# Patient Record
Sex: FEMALE | Race: WHITE
[De-identification: names, ages, dates, MRNs, and addresses within clinical notes are randomized per-mention and may not be internally consistent; named-entity substitution may affect disease eponyms.]

---

## 2018-05-04 ENCOUNTER — HOSPITAL ENCOUNTER (INPATIENT)
Dept: HOSPITAL 17 - PHED | Age: 83
LOS: 3 days | Discharge: HOME | DRG: 378 | End: 2018-05-07
Attending: HOSPITALIST | Admitting: HOSPITALIST
Payer: COMMERCIAL

## 2018-05-04 VITALS
RESPIRATION RATE: 18 BRPM | OXYGEN SATURATION: 97 % | SYSTOLIC BLOOD PRESSURE: 150 MMHG | DIASTOLIC BLOOD PRESSURE: 62 MMHG | HEART RATE: 77 BPM

## 2018-05-04 VITALS
OXYGEN SATURATION: 98 % | TEMPERATURE: 98.5 F | RESPIRATION RATE: 16 BRPM | DIASTOLIC BLOOD PRESSURE: 65 MMHG | HEART RATE: 80 BPM | SYSTOLIC BLOOD PRESSURE: 152 MMHG

## 2018-05-04 VITALS
OXYGEN SATURATION: 99 % | HEART RATE: 86 BPM | DIASTOLIC BLOOD PRESSURE: 79 MMHG | SYSTOLIC BLOOD PRESSURE: 188 MMHG | RESPIRATION RATE: 20 BRPM | TEMPERATURE: 97.2 F

## 2018-05-04 VITALS
RESPIRATION RATE: 20 BRPM | TEMPERATURE: 97 F | SYSTOLIC BLOOD PRESSURE: 109 MMHG | OXYGEN SATURATION: 99 % | HEART RATE: 60 BPM | DIASTOLIC BLOOD PRESSURE: 54 MMHG

## 2018-05-04 VITALS — OXYGEN SATURATION: 97 %

## 2018-05-04 VITALS — HEIGHT: 63 IN | BODY MASS INDEX: 16.95 KG/M2 | WEIGHT: 95.68 LBS

## 2018-05-04 VITALS
RESPIRATION RATE: 18 BRPM | HEART RATE: 75 BPM | OXYGEN SATURATION: 99 % | DIASTOLIC BLOOD PRESSURE: 75 MMHG | SYSTOLIC BLOOD PRESSURE: 172 MMHG

## 2018-05-04 VITALS — DIASTOLIC BLOOD PRESSURE: 83 MMHG | SYSTOLIC BLOOD PRESSURE: 129 MMHG

## 2018-05-04 VITALS — DIASTOLIC BLOOD PRESSURE: 88 MMHG | SYSTOLIC BLOOD PRESSURE: 168 MMHG

## 2018-05-04 VITALS — OXYGEN SATURATION: 99 %

## 2018-05-04 DIAGNOSIS — I10: ICD-10-CM

## 2018-05-04 DIAGNOSIS — K62.1: ICD-10-CM

## 2018-05-04 DIAGNOSIS — K55.21: ICD-10-CM

## 2018-05-04 DIAGNOSIS — K64.8: ICD-10-CM

## 2018-05-04 DIAGNOSIS — B96.20: ICD-10-CM

## 2018-05-04 DIAGNOSIS — I48.2: ICD-10-CM

## 2018-05-04 DIAGNOSIS — K29.71: Primary | ICD-10-CM

## 2018-05-04 DIAGNOSIS — F17.210: ICD-10-CM

## 2018-05-04 DIAGNOSIS — Z79.01: ICD-10-CM

## 2018-05-04 DIAGNOSIS — E78.5: ICD-10-CM

## 2018-05-04 DIAGNOSIS — D62: ICD-10-CM

## 2018-05-04 DIAGNOSIS — R60.0: ICD-10-CM

## 2018-05-04 DIAGNOSIS — N39.0: ICD-10-CM

## 2018-05-04 DIAGNOSIS — Z95.0: ICD-10-CM

## 2018-05-04 DIAGNOSIS — E78.00: ICD-10-CM

## 2018-05-04 LAB
BACTERIA #/AREA URNS HPF: (no result) /HPF
BASOPHILS # BLD AUTO: 0 TH/MM3 (ref 0–0.2)
BASOPHILS NFR BLD: 0.8 % (ref 0–2)
BUN SERPL-MCNC: 18 MG/DL (ref 7–18)
CALCIUM SERPL-MCNC: 8.3 MG/DL (ref 8.5–10.1)
CHLORIDE SERPL-SCNC: 103 MEQ/L (ref 98–107)
COLOR UR: YELLOW
CREAT SERPL-MCNC: 0.69 MG/DL (ref 0.5–1)
EOSINOPHIL # BLD: 0.1 TH/MM3 (ref 0–0.4)
EOSINOPHIL NFR BLD: 2.7 % (ref 0–4)
ERYTHROCYTE [DISTWIDTH] IN BLOOD BY AUTOMATED COUNT: 17 % (ref 11.6–17.2)
GFR SERPLBLD BASED ON 1.73 SQ M-ARVRAT: 80 ML/MIN (ref 89–?)
GLUCOSE SERPL-MCNC: 109 MG/DL (ref 74–106)
GLUCOSE UR STRIP-MCNC: (no result) MG/DL
HCO3 BLD-SCNC: 26.8 MEQ/L (ref 21–32)
HCT VFR BLD CALC: 21.7 % (ref 35–46)
HGB BLD-MCNC: 6.4 GM/DL (ref 11.6–15.3)
HGB UR QL STRIP: (no result)
INR PPP: 1.1 RATIO
INR PPP: 1.1 RATIO
KETONES UR STRIP-MCNC: (no result) MG/DL
LYMPHOCYTES # BLD AUTO: 0.9 TH/MM3 (ref 1–4.8)
LYMPHOCYTES NFR BLD AUTO: 21.1 % (ref 9–44)
MCH RBC QN AUTO: 19.9 PG (ref 27–34)
MCHC RBC AUTO-ENTMCNC: 29.3 % (ref 32–36)
MCV RBC AUTO: 67.9 FL (ref 80–100)
MONOCYTE #: 0.4 TH/MM3 (ref 0–0.9)
MONOCYTES NFR BLD: 10 % (ref 0–8)
NEUTROPHILS # BLD AUTO: 2.8 TH/MM3 (ref 1.8–7.7)
NEUTROPHILS NFR BLD AUTO: 65.4 % (ref 16–70)
NITRITE UR QL STRIP: (no result)
PLATELET # BLD: 407 TH/MM3 (ref 150–450)
PMV BLD AUTO: 7.2 FL (ref 7–11)
PROTHROMBIN TIME: 10.7 SEC (ref 9.8–11.6)
PROTHROMBIN TIME: 10.7 SEC (ref 9.8–11.6)
RBC # BLD AUTO: 3.19 MIL/MM3 (ref 4–5.3)
SCHISTOCYTES BLD QL SMEAR: (no result)
SODIUM SERPL-SCNC: 135 MEQ/L (ref 136–145)
SP GR UR STRIP: (no result) (ref 1–1.03)
TARGETS BLD QL SMEAR: (no result)
URINE LEUKOCYTE ESTERASE: (no result)
WBC # BLD AUTO: 4.2 TH/MM3 (ref 4–11)
WHITE BLOOD CELL CLUMPS: (no result)

## 2018-05-04 PROCEDURE — 80048 BASIC METABOLIC PNL TOTAL CA: CPT

## 2018-05-04 PROCEDURE — 85730 THROMBOPLASTIN TIME PARTIAL: CPT

## 2018-05-04 PROCEDURE — C9132 KCENTRA, PER I.U.: HCPCS

## 2018-05-04 PROCEDURE — 86850 RBC ANTIBODY SCREEN: CPT

## 2018-05-04 PROCEDURE — 87077 CULTURE AEROBIC IDENTIFY: CPT

## 2018-05-04 PROCEDURE — 93970 EXTREMITY STUDY: CPT

## 2018-05-04 PROCEDURE — 81001 URINALYSIS AUTO W/SCOPE: CPT

## 2018-05-04 PROCEDURE — C9113 INJ PANTOPRAZOLE SODIUM, VIA: HCPCS

## 2018-05-04 PROCEDURE — 87086 URINE CULTURE/COLONY COUNT: CPT

## 2018-05-04 PROCEDURE — 86920 COMPATIBILITY TEST SPIN: CPT

## 2018-05-04 PROCEDURE — 85025 COMPLETE CBC W/AUTO DIFF WBC: CPT

## 2018-05-04 PROCEDURE — 96361 HYDRATE IV INFUSION ADD-ON: CPT

## 2018-05-04 PROCEDURE — 36430 TRANSFUSION BLD/BLD COMPNT: CPT

## 2018-05-04 PROCEDURE — 74177 CT ABD & PELVIS W/CONTRAST: CPT

## 2018-05-04 PROCEDURE — 96375 TX/PRO/DX INJ NEW DRUG ADDON: CPT

## 2018-05-04 PROCEDURE — 86901 BLOOD TYPING SEROLOGIC RH(D): CPT

## 2018-05-04 PROCEDURE — 86900 BLOOD TYPING SEROLOGIC ABO: CPT

## 2018-05-04 PROCEDURE — 93005 ELECTROCARDIOGRAM TRACING: CPT

## 2018-05-04 PROCEDURE — 87186 SC STD MICRODIL/AGAR DIL: CPT

## 2018-05-04 PROCEDURE — P9016 RBC LEUKOCYTES REDUCED: HCPCS

## 2018-05-04 PROCEDURE — 88305 TISSUE EXAM BY PATHOLOGIST: CPT

## 2018-05-04 PROCEDURE — 96374 THER/PROPH/DIAG INJ IV PUSH: CPT

## 2018-05-04 PROCEDURE — 85610 PROTHROMBIN TIME: CPT

## 2018-05-04 RX ADMIN — Medication SCH ML: at 19:44

## 2018-05-04 RX ADMIN — PANTOPRAZOLE SODIUM SCH MG: 40 INJECTION, POWDER, FOR SOLUTION INTRAVENOUS at 16:14

## 2018-05-04 RX ADMIN — CIPROFLOXACIN SCH MLS/HR: 2 INJECTION, SOLUTION INTRAVENOUS at 17:33

## 2018-05-04 RX ADMIN — STANDARDIZED SENNA CONCENTRATE AND DOCUSATE SODIUM SCH TAB: 8.6; 5 TABLET, FILM COATED ORAL at 21:11

## 2018-05-04 RX ADMIN — LOSARTAN POTASSIUM SCH MG: 50 TABLET, FILM COATED ORAL at 21:11

## 2018-05-04 NOTE — HHI.HP
__________________________________________________





HPI


Service


[facility]


Primary Care Physician


Yumiko Kahn MD


Admission Diagnosis


GI bleed on xarelto


Diagnoses:  


(1) GI bleed


Chief Complaint:  


Anemia


Dark stools


Travel History


International Travel<30 Days:  No


Contact w/Intl Traveler <30 Da:  No


Traveled to Known Affected Are:  No


History of Present Illness


This is a pleasant 88-year-old female patient with a known medical history of 

A. fib on anticoagulation, hypertension hyperlipidemia who presented to the ED 

with a hemoglobin of 6.4.  Patient visited her PCP office this week and was 

called with results of anemia and sent here to the ED.  Patient does state that 

over the past couple months she has noticed black stools with occasional bright 

red blood when she wipes.  Patient denies any other associated symptoms such as 

lightheadedness, dizziness.  She does admit to increasing fatigue and sleeping 

more often.  Patient has been on Xarelto for chronic atrial fibrillation, 

patient is followed with Dr. Parish cardiology. Denies any recent fever, 

chills, cough, chest pain, shortness of breath, nausea, vomiting, diarrhea, 

abdominal pain or dysuria.  Does admit to bilateral lower extremity swelling.  

Patient lives at home with her daughter.  PCP Dr. Kahn.





Review of Systems


Constitutional:  COMPLAINS OF: Fatigue, DENIES: Fever, Chills


Eyes:  DENIES: Diplopia


Respiratory:  DENIES: Cough, Sputum production, Shortness of breath


Cardiovascular:  DENIES: Chest pain, Palpitations


Gastrointestinal:  COMPLAINS OF: Black stools, DENIES: Abdominal pain, Bloody 

stools, Constipation, Diarrhea, Nausea, Vomiting


Musculoskeletal:  DENIES: Joint pain


Hematologic/lymphatic:  DENIES: Bruising


Neurologic:  DENIES: Abnormal gait


Psychiatric:  DENIES: Anxiety


Except as stated in HPI:  all other systems reviewed are Neg





Past Family Social History


Past Medical History


Atrial fibrillation on Coumadin


Anxiety depression


Hyperlipidemia


Hypertension


Past Surgical History


Pacemaker placement


Left side cataracts


Tonsillectomy


Appendectomy


Reported Medications


Reported


[glaucoma drops]   1 Drop OP BID


Losartan (Losartan Potassium) 100 Mg Tab 100 Mg PO HS


Xarelto (Rivaroxaban) 20 Mg Tab 20 Mg PO DAILY


Allergies:  


Coded Allergies:  


     penicillin G (Unverified  Allergy, Severe, diarrhea, 18)


Active Ordered Medications





Current Medications








 Medications


  (Trade)  Dose


 Ordered  Sig/Rupali


 Route  Start Time


 Stop Time Status Last Admin


 


 Sodium Chloride  250 ml @ 


 15 mls/hr  ONCE  ONCE


 IV  18 12:30


 18 05:09   


 


 


  (NS Flush)  2 ml  UNSCH  PRN


 IV FLUSH  18 13:45


     


 


 


  (NS Flush)  2 ml  BID


 IV FLUSH  18 21:00


     


 


 


  (Tylenol)  650 mg  Q4H  PRN


 PO  18 13:45


     


 


 


  (Zofran Inj)  4 mg  Q6H  PRN


 IVP  18 13:45


     


 


 


  (Narcan Inj)  0.4 mg  UNSCH  PRN


 IV PUSH  18 13:45


     


 


 


  (Sepideh-Colace)  1 tab  BID


 PO  18 21:00


     


 


 


  (Milk Of


 Magnesia Liq)  30 ml  Q12H  PRN


 PO  18 13:45


     


 


 


  (Senokot)  17.2 mg  Q12H  PRN


 PO  18 13:45


     


 


 


  (Dulcolax Supp)  10 mg  DAILY  PRN


 RECTAL  18 13:45


     


 


 


  (Protonix Inj)  40 mg  Q12H


 IV PUSH  18 16:00


     


 


 


 Sodium Chloride  250 ml @ 


 15 mls/hr  ONCE  ONCE


 IV  18 15:30


 18 08:09   


 


 


  (Cozaar)  100 mg  HS


 PO  18 21:00


     


 


 


  (Catapres)  0.1 mg  Q6H  PRN


 PO  18 15:30


     


 








Family History


Denies any significant family medical history.


Social History


Does admit to smoking 8 cigarettes per day.  Denies any alcohol or illicit drug 

use.





Physical Exam


Vital Signs





Vital Signs








  Date Time  Temp Pulse Resp B/P (MAP) Pulse Ox O2 Delivery O2 Flow Rate FiO2


 


18 12:39  77 18 150/62 (91) 97 Room Air  


 


18 11:50     97 Room Air  


 


18 11:28 98.5 80 16 152/65 (94) 98   








Physical Exam


GENERAL: Well-developed, well-nourished elderly female patient in NAD.


SKIN: Warm and dry. No rash.


HEAD:  Normocephalic. Atraumatic.


EYES: Pupils equal and round. No scleral icterus. No injection or drainage. 


ENT: No nasal bleeding or discharge.  Mucous membranes pink and moist.


NECK: Supple. Trachea midline.  


CARDIOVASCULAR: Regular rate and rhythm.  S1, S2 noted. No murmur appreciated. 


RESPIRATORY: No accessory muscle use. Clear to auscultation. Breath sounds 

equal bilaterally.  


GASTROINTESTINAL: Abdomen soft, non-tender, nondistended. Normoactive bowel 

sounds x4.


MUSCULOSKELETAL: No obvious deformities. Extremities without clubbing, 

cyanosis.  Bilateral lower extremity edema, 2+.


NEUROLOGICAL: Awake and alert. No obvious cranial nerve deficits.  Motor 

grossly within normal limits. 5/5 muscle strength in bilateral upper and lower 

extremities.  Normal speech.


PSYCHIATRIC: Appropriate mood and affect; insight and judgment normal.


Laboratory





Laboratory Tests








Test


  18


12:08 18


13:30


 


White Blood Count 4.2  


 


Red Blood Count 3.19  


 


Hemoglobin 6.4  


 


Hematocrit 21.7  


 


Mean Corpuscular Volume 67.9  


 


Mean Corpuscular Hemoglobin 19.9  


 


Mean Corpuscular Hemoglobin


Concent 29.3 


  


 


 


Red Cell Distribution Width 17.0  


 


Platelet Count 407  


 


Mean Platelet Volume 7.2  


 


Neutrophils (%) (Auto) 65.4  


 


Lymphocytes (%) (Auto) 21.1  


 


Monocytes (%) (Auto) 10.0  


 


Eosinophils (%) (Auto) 2.7  


 


Basophils (%) (Auto) 0.8  


 


Neutrophils # (Auto) 2.8  


 


Lymphocytes # (Auto) 0.9  


 


Monocytes # (Auto) 0.4  


 


Eosinophils # (Auto) 0.1  


 


Basophils # (Auto) 0.0  


 


CBC Comment AUTO DIFF  


 


Differential Comment


  AUTO DIFF


CONFIRMED 


 


 


Target Cells 1+  


 


Keratocytes OCC  


 


Prothrombin Time 10.7  


 


Prothromb Time International


Ratio 1.1 


  


 


 


Activated Partial


Thromboplast Time 24.7 


  


 


 


Blood Urea Nitrogen 18  


 


Creatinine 0.69  


 


Random Glucose 109  


 


Calcium Level 8.3  


 


Sodium Level 135  


 


Potassium Level 4.0  


 


Chloride Level 103  


 


Carbon Dioxide Level 26.8  


 


Anion Gap 5  


 


Estimat Glomerular Filtration


Rate 80 


  


 








Result Diagram:  


18 1208                                                                    

            18 1208








Septic Shock Reassessment


Septic shock perfusion:  reassessment completed





Caprini VTE Risk Assessment


Caprini VTE Risk Assessment:  Mod/High Risk (score >= 2)


Caprini Risk Assessment Model











 Point Value = 1          Point Value = 2  Point Value = 3  Point Value = 5


 


Age 41-60


Minor surgery


BMI > 25 kg/m2


Swollen legs


Varicose veins


Pregnancy or postpartum


History of unexplained or recurrent


   spontaneous 


Oral contraceptives or hormone


   replacement


Sepsis (< 1 month)


Serious lung disease, including


   pneumonia (< 1 month)


Abnormal pulmonary function


Acute myocardial infarction


Congestive heart failure (< 1 month)


History of inflammatory bowel disease


Medical patient at bed rest Age 61-74


Arthroscopic surgery


Major open surgery (> 45 min)


Laparoscopic surgery (> 45 min)


Malignancy


Confined to bed (> 72 hours)


Immobilizing plaster cast


Central venous access Age >= 75


History of VTE


Family history of VTE


Factor V Leiden


Prothrombin [id number]


Lupus anticoagulant


Anticardiolipin antibodies


Elevated serum homocysteine


Heparin-induced thrombocytopenia


Other congenital or acquired


   thrombophilia Stroke (< 1 month)


Elective arthroplasty


Hip, pelvis, or leg fracture


Acute spinal cord injury (< 1 month)








Prophylaxis Regimen











   Total Risk


Factor Score Risk Level Prophylaxis Regimen


 


0-1      Low Early ambulation


 


2 Moderate Order ONE of the following:


*Sequential Compression Device (SCD)


*Heparin 5000 units SQ BID


 


3-4 Higher Order ONE of the following medications:


*Heparin 5000 units SQ TID


*Enoxaparin/Lovenox 40 mg SQ daily (WT < 150 kg, CrCl > 30 mL/min)


*Enoxaparin/Lovenox 30 mg SQ daily (WT < 150 kg, CrCl > 10-29 mL/min)


*Enoxaparin/Lovenox 30 mg SQ BID (WT < 150 kg, CrCl > 30 mL/min)


AND/OR


*Sequential Compression Device (SCD)


 


5 or more Highest Order ONE of the following medications:


*Heparin 5000 units SQ TID (Preferred with Epidurals)


*Enoxaparin/Lovenox 40 mg SQ daily (WT < 150 kg, CrCl > 30 mL/min)


*Enoxaparin/Lovenox 30 mg SQ daily (WT < 150 kg, CrCl > 10-29 mL/min)


*Enoxaparin/Lovenox 30 mg SQ BID (WT < 150 kg, CrCl > 30 mL/min)


AND


*Sequential Compression Device (SCD)











Assessment and Plan


Problem List:  


(1) GI bleed


ICD Code:  K92.2 - Gastrointestinal hemorrhage, unspecified


Status:  Acute


Assessment and Plan


This is a pleasant 88-year-old female patient with a known medical history of 

A. fib on anticoagulation, hypertension hyperlipidemia who presented to the ED 

with a hemoglobin of 6.4.  Patient visited her PCP office this week and was 

called with results of anemia and sent here to the ED. 





Acute blood loss secondary to GI bleed with normocytic normochromic anemia.


- Hemoglobin 6.4/hematocrit 21.7 on presentation.  With black stools. Was given 

K Centra in ED.


- Consult placed to GI, appreciate input recommendations. Patient is refusing 

colonoscopy and EGD if needed.


- Type and screen, transfuse 2 units PRBC.  Trend H&H.  Monitor for any active 

bleeding.  Monitor vital signs.  Blood pressure has been stable.


- Monitor for fluid overload. Lasix given in between PRBC transfusion.


- Protonix IV BID. 





Abnormal UA with presence of leukocyte esterase and white blood cells.  Culture 

pending. Follow. IV Cipro, patient allergic to penicillins. 





Atrial fibrillation, chronic: On Xarelto at home. On hold due to GI bleed and 

acute blood loss. Continue cardiac telemetry. Monitor.





Hypertension, chronic: Elevated on presentation. Continue home medications. 

Monitor BP trends. Clonidine PRN as needed.





DVT Prophylaxis: SCDs. Hold chemical prophylaxis due to GI bleed.





Physician Certification


2 Midnight Certification Type:  Admission for Inpatient Services


Order for Inpatient Services


The services are ordered in accordance with Medicare regulations or non-

Medicare payer requirements, as applicable.  In the case of services not 

specified as inpatient-only, they are appropriately provided as inpatient 

services in accordance with the 2-midnight benchmark.


Estimated LOS (days):  3


3 days is the estimated time the patient will need to remain in the hospital, 

assuming treatment plan goals are met and no additional complications.


Post-Hospital Plan:  Not yet determined





Problem Qualifiers





(1) GI bleed:  


Qualified Codes:  K92.2 - Gastrointestinal hemorrhage, unspecified








Britt Mejia May 4, 2018 13:45

## 2018-05-04 NOTE — PD
HPI


Chief Complaint:  Abnormal Results


Time Seen by Provider:  11:40


Travel History


International Travel<30 days:  No


Contact w/Intl Traveler<30days:  No


Traveled to known affect area:  No





History of Present Illness


HPI


89yo F with PMH of afib on xarelto, HTN was sent here by her PMD Dr. Kahn 

for low hemoglobin of 6.4 on routine blood work drawn 2 days ago.  Pt denies 

any black stool, fever, cough, chest pain, sob, n/v, abdominal pain, focal 

weakness or numbness.  Pt does have swelling in both her feet.  Said she has 

had GI bleeds before but she has never had a colonoscopy and does not ever want 

one.





PFSH


Past Medical History


Blood Disorders:  No


Anxiety:  Yes


Depression:  Yes


Heart Rhythm Problems:  Yes (a-fib )


Cancer:  No


Cardiovascular Problems:  Yes


High Cholesterol:  Yes


Chest Pain:  Yes


Congestive Heart Failure:  No


Diminished Hearing:  No


Endocrine:  No


Genitourinary:  No


Hypertension:  Yes


Immune Disorder:  No


Implanted Vascular Access Dvce:  Yes


Musculoskeletal:  No


Neurologic:  No


Psychiatric:  Yes


Reproductive:  No


Respiratory:  No


Pregnant?:  Not Pregnant


Dilation and Curettage (D&C):  Yes





Past Surgical History


AICD:  No


Arteriovenous Shunt:  No


Cardiac Surgery:  Yes (pace maker )


Ear Surgery:  No


Endocrine Surgery:  No


Eye Surgery:  Yes (cataracts  LEFT SIDE)


Genitourinary Surgery:  No


Gynecologic Surgery:  Yes (d/c)


Insulin Pump:  No


Joint Replacement:  No


Oral Surgery:  No


Pacemaker:  Yes


Thoracic Surgery:  No


Other Surgery:  Yes





Social History


Alcohol Use:  No


Tobacco Use:  No


Substance Use:  No





Allergies-Medications


(Allergen,Severity, Reaction):  


Coded Allergies:  


     penicillin G (Unverified  Allergy, Severe, diarrhea, 5/4/18)


Reported Meds & Prescriptions





Reported Meds & Active Scripts


Active


Reported


[glaucoma drops]   1 Drop OP BID


Losartan (Losartan Potassium) 100 Mg Tab 100 Mg PO HS


Xarelto (Rivaroxaban) 20 Mg Tab 20 Mg PO DAILY








Review of Systems


Except as stated in HPI:  all other systems reviewed are Neg





Physical Exam


Narrative


GENERAL: 89yo F not in distress.


SKIN: Focused skin assessment warm/dry.


HEAD: Atraumatic. Normocephalic. 


EYES: Pupils equal and round. No scleral icterus. No injection or drainage. 


CARDIOVASCULAR: Regular rate and rhythm.  No murmur appreciated.


RESPIRATORY: No accessory muscle use. Clear to auscultation. Breath sounds 

equal bilaterally. 


GASTROINTESTINAL: Abdomen soft, non-tender, nondistended. 


RECTAL: Brown stool, positive hemaprompt.


MUSCULOSKELETAL: No obvious deformities. No clubbing.  No cyanosis.  No edema. 


NEUROLOGICAL: Awake and alert. No obvious cranial nerve deficits.  Motor 

grossly within normal limits. Normal speech.


PSYCHIATRIC: Appropriate mood and affect; insight and judgment normal.





Data


Data


Last Documented VS





Vital Signs








  Date Time  Temp Pulse Resp B/P (MAP) Pulse Ox O2 Delivery O2 Flow Rate FiO2


 


5/4/18 12:39  77 18 150/62 (91) 97 Room Air  


 


5/4/18 11:28 98.5       








Orders





 Orders


Basic Metabolic Panel (Bmp) (5/4/18 11:54)


Complete Blood Count With Diff (5/4/18 11:54)


Prothrombin Time / Inr (Pt) (5/4/18 11:54)


Act Partial Throm Time (Ptt) (5/4/18 11:54)


Type And Screen (5/4/18 11:54)


Pantoprazole Inj (Protonix Inj) (5/4/18 12:00)


Electrocardiogram (5/4/18 )


^ Lab Follow Up (5/4/18 12:20)


Prothrombin Complex Conc Inj (Kcentra In (5/4/18 14:00)


Blood Product Administration (5/4/18 12:20)


Sodium Chlor 0.9% 250 Ml Inj (Ns 250 Ml (5/4/18 12:30)


Consult Cardiology (5/4/18 )


Consult Gastroenterology (5/4/18 )


Admit Order (Ed Use Only) (5/4/18 13:13)





Labs





Laboratory Tests








Test


  5/4/18


12:08


 


White Blood Count 4.2 TH/MM3 


 


Red Blood Count 3.19 MIL/MM3 


 


Hemoglobin 6.4 GM/DL 


 


Hematocrit 21.7 % 


 


Mean Corpuscular Volume 67.9 FL 


 


Mean Corpuscular Hemoglobin 19.9 PG 


 


Mean Corpuscular Hemoglobin


Concent 29.3 % 


 


 


Red Cell Distribution Width 17.0 % 


 


Platelet Count 407 TH/MM3 


 


Mean Platelet Volume 7.2 FL 


 


Neutrophils (%) (Auto) 65.4 % 


 


Lymphocytes (%) (Auto) 21.1 % 


 


Monocytes (%) (Auto) 10.0 % 


 


Eosinophils (%) (Auto) 2.7 % 


 


Basophils (%) (Auto) 0.8 % 


 


Neutrophils # (Auto) 2.8 TH/MM3 


 


Lymphocytes # (Auto) 0.9 TH/MM3 


 


Monocytes # (Auto) 0.4 TH/MM3 


 


Eosinophils # (Auto) 0.1 TH/MM3 


 


Basophils # (Auto) 0.0 TH/MM3 


 


CBC Comment AUTO DIFF 


 


Differential Comment


  AUTO DIFF


CONFIRMED


 


Target Cells 1+ 


 


Keratocytes OCC 


 


Prothrombin Time 10.7 SEC 


 


Prothromb Time International


Ratio 1.1 RATIO 


 


 


Activated Partial


Thromboplast Time 24.7 SEC 


 


 


Blood Urea Nitrogen 18 MG/DL 


 


Creatinine 0.69 MG/DL 


 


Random Glucose 109 MG/DL 


 


Calcium Level 8.3 MG/DL 


 


Sodium Level 135 MEQ/L 


 


Potassium Level 4.0 MEQ/L 


 


Chloride Level 103 MEQ/L 


 


Carbon Dioxide Level 26.8 MEQ/L 


 


Anion Gap 5 MEQ/L 


 


Estimat Glomerular Filtration


Rate 80 ML/MIN 


 











MDM


Medical Decision Making


Medical Screen Exam Complete:  Yes


Emergency Medical Condition:  Yes


Interpretation(s)


EKG: Paced rhythm.  No concordance.  ST depression V6 only.


Differential Diagnosis


Diverticulitis vs. AV malformation vs. hemorrhoid vs. upper GI bleed


Narrative Course


89yo F was brought in here for low hemoglobin.  Pt has positive hemaprompt and 

on xarelto. Last dose of xarelto was last night.  Labs reviewed, no 

leukocytosis.  H/H low at 6.4/21.7.  Last H/H was from 2012 and it was 11/33.  

BMP unremarkable.  Pt given K-centra to reverse anticoagulation and will 

transfuse one unit of PRBC for now.  Pt is well appearing with stable vital 

signs.  Will admit patient and consult GI although pt may not want any 

procedures.  Pt wants us to notify her cardiologist Dr. Parish.  Will place 

consult.  Discussed with Dr. Culver's PA and accepted to her service.





Pt has already been admitted but nurse wants to send urine since pt now said 

she has some dysuria and gave a urine sample.  UA sent.  UA showed positive 

nitrite, hospitalist said she will write for antibiotics.





[facility] Point of Care


Internal Pos. & Neg. Controls:  Passed


Fecal Specimen Occult Blood:  Positive





Diagnosis





 Primary Impression:  


 GI bleed


 Qualified Codes:  K92.2 - Gastrointestinal hemorrhage, unspecified





Admitting Information


Admitting Physician Requests:  Admit











Debbie Brown DO May 4, 2018 12:04

## 2018-05-04 NOTE — EKG
Date Performed: 05/04/2018       Time Performed: 12:09:26

 

PTAGE:      88 years

 

EKG:      ELECTRONIC VENTRICULAR PACEMAKER When compared to previous tracing, ventricular pacing is n
ow Present. Atrial flutter previouslyn seen is no longer present. ABNORMAL RHYTHM ECG

 

PREVIOUS TRACING       : 10/09/2012 22.16

 

DOCTOR:   Mauro Patel  Interpretating Date/Time  05/04/2018 17:54:14

## 2018-05-05 VITALS
TEMPERATURE: 98.1 F | RESPIRATION RATE: 18 BRPM | OXYGEN SATURATION: 97 % | DIASTOLIC BLOOD PRESSURE: 65 MMHG | SYSTOLIC BLOOD PRESSURE: 145 MMHG | HEART RATE: 69 BPM

## 2018-05-05 VITALS
TEMPERATURE: 96.2 F | RESPIRATION RATE: 20 BRPM | SYSTOLIC BLOOD PRESSURE: 145 MMHG | HEART RATE: 72 BPM | OXYGEN SATURATION: 99 % | DIASTOLIC BLOOD PRESSURE: 67 MMHG

## 2018-05-05 VITALS
TEMPERATURE: 96.3 F | RESPIRATION RATE: 18 BRPM | SYSTOLIC BLOOD PRESSURE: 140 MMHG | OXYGEN SATURATION: 96 % | DIASTOLIC BLOOD PRESSURE: 71 MMHG | HEART RATE: 68 BPM

## 2018-05-05 VITALS
DIASTOLIC BLOOD PRESSURE: 108 MMHG | OXYGEN SATURATION: 97 % | HEART RATE: 77 BPM | TEMPERATURE: 96 F | RESPIRATION RATE: 16 BRPM | SYSTOLIC BLOOD PRESSURE: 160 MMHG

## 2018-05-05 VITALS
DIASTOLIC BLOOD PRESSURE: 77 MMHG | TEMPERATURE: 97.2 F | HEART RATE: 65 BPM | OXYGEN SATURATION: 97 % | SYSTOLIC BLOOD PRESSURE: 178 MMHG | RESPIRATION RATE: 17 BRPM

## 2018-05-05 VITALS
HEART RATE: 70 BPM | RESPIRATION RATE: 20 BRPM | TEMPERATURE: 96.1 F | SYSTOLIC BLOOD PRESSURE: 167 MMHG | OXYGEN SATURATION: 98 % | DIASTOLIC BLOOD PRESSURE: 72 MMHG

## 2018-05-05 VITALS — HEART RATE: 71 BPM

## 2018-05-05 VITALS
HEART RATE: 64 BPM | SYSTOLIC BLOOD PRESSURE: 156 MMHG | RESPIRATION RATE: 18 BRPM | DIASTOLIC BLOOD PRESSURE: 80 MMHG | TEMPERATURE: 97.6 F

## 2018-05-05 VITALS
TEMPERATURE: 98.5 F | HEART RATE: 89 BPM | RESPIRATION RATE: 17 BRPM | OXYGEN SATURATION: 98 % | DIASTOLIC BLOOD PRESSURE: 78 MMHG | SYSTOLIC BLOOD PRESSURE: 173 MMHG

## 2018-05-05 VITALS — OXYGEN SATURATION: 97 %

## 2018-05-05 LAB
BASOPHILS # BLD AUTO: 0 TH/MM3 (ref 0–0.2)
BASOPHILS NFR BLD: 0.3 % (ref 0–2)
BUN SERPL-MCNC: 9 MG/DL (ref 7–18)
CALCIUM SERPL-MCNC: 8.7 MG/DL (ref 8.5–10.1)
CHLORIDE SERPL-SCNC: 97 MEQ/L (ref 98–107)
CREAT SERPL-MCNC: 0.62 MG/DL (ref 0.5–1)
EOSINOPHIL # BLD: 0.1 TH/MM3 (ref 0–0.4)
EOSINOPHIL NFR BLD: 1.6 % (ref 0–4)
ERYTHROCYTE [DISTWIDTH] IN BLOOD BY AUTOMATED COUNT: 20.5 % (ref 11.6–17.2)
GFR SERPLBLD BASED ON 1.73 SQ M-ARVRAT: 91 ML/MIN (ref 89–?)
GLUCOSE SERPL-MCNC: 110 MG/DL (ref 74–106)
HCO3 BLD-SCNC: 32.4 MEQ/L (ref 21–32)
HCT VFR BLD CALC: 32.4 % (ref 35–46)
HGB BLD-MCNC: 10 GM/DL (ref 11.6–15.3)
INR PPP: 1 RATIO
INR PPP: 1.1 RATIO
INR PPP: 1.1 RATIO
INR PPP: 1.2 RATIO
LYMPHOCYTES # BLD AUTO: 0.8 TH/MM3 (ref 1–4.8)
LYMPHOCYTES NFR BLD AUTO: 16.4 % (ref 9–44)
MCH RBC QN AUTO: 22.9 PG (ref 27–34)
MCHC RBC AUTO-ENTMCNC: 30.9 % (ref 32–36)
MCV RBC AUTO: 74.2 FL (ref 80–100)
MONOCYTE #: 0.5 TH/MM3 (ref 0–0.9)
MONOCYTES NFR BLD: 9.6 % (ref 0–8)
NEUTROPHILS # BLD AUTO: 3.6 TH/MM3 (ref 1.8–7.7)
NEUTROPHILS NFR BLD AUTO: 72.1 % (ref 16–70)
OVALOCYTES BLD QL SMEAR: (no result)
PLATELET # BLD: 393 TH/MM3 (ref 150–450)
PMV BLD AUTO: 7 FL (ref 7–11)
PROTHROMBIN TIME: 10.4 SEC (ref 9.8–11.6)
PROTHROMBIN TIME: 10.7 SEC (ref 9.8–11.6)
PROTHROMBIN TIME: 11.2 SEC (ref 9.8–11.6)
PROTHROMBIN TIME: 12.3 SEC (ref 9.8–11.6)
RBC # BLD AUTO: 4.36 MIL/MM3 (ref 4–5.3)
SODIUM SERPL-SCNC: 136 MEQ/L (ref 136–145)
WBC # BLD AUTO: 5 TH/MM3 (ref 4–11)

## 2018-05-05 PROCEDURE — 30233N1 TRANSFUSION OF NONAUTOLOGOUS RED BLOOD CELLS INTO PERIPHERAL VEIN, PERCUTANEOUS APPROACH: ICD-10-PCS | Performed by: EMERGENCY MEDICINE

## 2018-05-05 RX ADMIN — Medication PRN ML: at 15:59

## 2018-05-05 RX ADMIN — PANTOPRAZOLE SODIUM SCH MG: 40 INJECTION, POWDER, FOR SOLUTION INTRAVENOUS at 04:00

## 2018-05-05 RX ADMIN — PHENYTOIN SODIUM SCH MLS/HR: 50 INJECTION INTRAMUSCULAR; INTRAVENOUS at 10:15

## 2018-05-05 RX ADMIN — STANDARDIZED SENNA CONCENTRATE AND DOCUSATE SODIUM SCH TAB: 8.6; 5 TABLET, FILM COATED ORAL at 08:04

## 2018-05-05 RX ADMIN — CIPROFLOXACIN SCH MLS/HR: 2 INJECTION, SOLUTION INTRAVENOUS at 16:51

## 2018-05-05 RX ADMIN — LOSARTAN POTASSIUM SCH MG: 50 TABLET, FILM COATED ORAL at 20:41

## 2018-05-05 RX ADMIN — PHENAZOPYRIDINE SCH MG: 100 TABLET ORAL at 20:41

## 2018-05-05 RX ADMIN — PHENAZOPYRIDINE SCH MG: 100 TABLET ORAL at 13:47

## 2018-05-05 RX ADMIN — STANDARDIZED SENNA CONCENTRATE AND DOCUSATE SODIUM SCH TAB: 8.6; 5 TABLET, FILM COATED ORAL at 20:41

## 2018-05-05 RX ADMIN — PANTOPRAZOLE SODIUM SCH MG: 40 INJECTION, POWDER, FOR SOLUTION INTRAVENOUS at 15:57

## 2018-05-05 RX ADMIN — Medication SCH ML: at 20:41

## 2018-05-05 RX ADMIN — Medication SCH ML: at 08:20

## 2018-05-05 NOTE — MB
cc:

Ashely Salinas MD

****

 

 

DATE:

05/05/2018

 

REFERRING PHYSICIAN:

Dr. Culver

 

YOB: 1929

 

REASON FOR REFERRAL:

Anemia.  Questionable GI bleed.

 

HISTORY OF PRESENT ILLNESS:

Mrs. Blackmon is an 88-year-old lady with multiple medical problems 

who came to the emergency room referred by her primary care doctor for

a low hemoglobin.  The patient states she had some black stool and 

occasional bright red blood per rectum a couple of months ago.  She 

had some small episodes recently.  Denies any nausea, vomiting, 

constipation, diarrhea or weight loss.  She has a very good appetite. 

Never had an endoscopy or a colonoscopy.  She states she refused in 

the past.  The patient reports some fatigue and increased sleepiness 

recently, but no other symptoms.

 

PAST MEDICAL HISTORY:

Atrial fibrillation, high blood pressure, hyperlipidemia, anxiety.

 

PAST SURGICAL HISTORY:

Pacemaker placement, left side cataract surgery, tonsillectomy and 

appendectomy.

 

MEDICATIONS:

Glaucoma drops, Losartan, Xarelto.

 

ALLERGIES:

PENICILLIN.

 

FAMILY HISTORY:

No family history of colon cancer or any other GI pathology.

 

SOCIAL HISTORY:

Smokes 8 cigarettes per day.  Denies any drug use or alcohol use.

 

REVIEW OF SYSTEMS:

CONSTITUTIONAL:  She denies any fever, chills, weight loss or weight 

gain.

ENT:  No alteration of baseline hearing or visual acuity.

PULMONARY:  Denies any chest pain, shortness of breath.

GASTROINTESTINAL: As above.

GENITOURINARY:  Denies dysuria, hematuria.

HEMATOLOGICAL:  No history of anemia or bleeding disorder.

SKIN:  No alteration of baseline skin lesion.

NEUROLOGIC:  No history of TIA or CVA kind of symptoms.

 

PHYSICAL EXAMINATION:

GENERAL:  On clinical exam the patient is sitting comfortably in bed, 

in no acute distress.

VITAL SIGNS:  Temperature is 97.6, pulse is 64, respirations 18, blood

pressure 156/80, saturation 97.

HEENT:   PERRLA.

NECK:  No JVD.  No lymphadenopathy.

CHEST:  Clear to auscultation and palpation.

HEART:  S1, S2.  No murmur.

ABDOMEN:  Soft, nontender.  Bowel sounds are present.

CNS:  Awake, alert, oriented x3.  No focal signs identified.

 

LABORATORY DATA:

Her hemoglobin on admission was 6.4 with an MCV of 67, platelets 307. 

White count normal.

PT/INR was normal.

Her chemistry was essentially negative, was within normal limit.

 

IMAGING STUDIES:

She had a CT abdomen and pelvis which was suggestive of normal CT.

 

ASSESSMENT:

Ms. Blackmon is a very pleasant 80-year-old lady admitted with anemia,

seems to be microcytic, most likely chronic.  No indication of active 

bleed at this time.

 

RECOMMENDATION:

Okay to give her regular food today, clear liquids tomorrow.  Okay to 

put her on Xarelto today, hold tomorrow and Monday morning.  EGD and 

colonoscopy in the morning.  Discussed in detail about the risks and 

benefits, and she is agreeing with it.

 

I would like to thank Dr. Britt Wilkins and Dr. Culver for referring 

her to our office for consultation.  Further recommendation will 

depend on the patient's clinical status and the above results.

 

 

__________________________________

Ashely Salinas MD

 

 

BSB/SB

D: 05/05/2018, 11:52 AM

T: 05/05/2018, 12:14 PM

Visit #: [id number]

Job #: [id number]

## 2018-05-05 NOTE — RADRPT
EXAM DATE/TIME:  05/04/2018 23:32 

 

[facility] COMPARISON:     

No previous studies available for comparison.

 

 

INDICATIONS :     

Dark stools.

                      

 

IV CONTRAST:     

75 cc Omnipaque 350 (iohexol) IV 

 

 

ORAL CONTRAST:      

Prescribed oral contrast ingested.

                      

 

RADIATION DOSE:     

4.83 CTDIvol (mGy) 

 

 

MEDICAL HISTORY :     

Cardiovascular disease. Hypertension. 

 

SURGICAL HISTORY :      

Pacemaker. 

 

ENCOUNTER:      

Initial

 

ACUITY:      

4 - 6 days

 

PAIN SCALE:      

5/10

 

LOCATION:         

abdomen

 

TECHNIQUE:     

Volumetric scanning of the abdomen and pelvis was performed.  Using automated exposure control and ad
justment of the mA and/or kV according to patient size, radiation dose was kept as low as reasonably 
achievable to obtain optimal diagnostic quality images.  DICOM format image data is available electro
nically for review and comparison.  

 

FINDINGS:     

 

LOWER LUNGS:     

Slight scarring in the lung bases, mainly on the left

 

LIVER:     

Homogeneous density without lesion.  There is no dilation of the biliary tree.  No calcified gallston
es.

 

SPLEEN:     

Normal size without lesion.

 

PANCREAS:     

Within normal limits.

 

KIDNEYS:     

Upper pole anterior cortex right renal cyst. No evidence of stone or hydronephrosis.

 

ADRENAL GLANDS:     

Slight low density nodular prominence of the left adrenal gland.

 

VASCULAR:     

Extensive atherosclerotic change with dense intimal calcifications involving aorta and branch vessels
.

 

BOWEL/MESENTERY:     

Distal colonic diverticulosis. No abnormal dilatation, wall thickening or focal inflammatory changes.


 

ABDOMINAL WALL:     

Within normal limits.

 

RETROPERITONEUM:     

There is no lymphadenopathy. 

 

BLADDER:     

No wall thickening or mass. 

 

REPRODUCTIVE:     

Calcification in uterine fibroids. Coarse right adnexal calcification

 

INGUINAL:     

Fluid containing left inguinal hernia

 

MUSCULOSKELETAL:     

Within normal limits for patient age. 

 

CONCLUSION:     

No definite acute CT findings in the abdomen or pelvis.

 

 

 

 David Boles MD on May 05, 2018 at 0:07           

Board Certified Radiologist.

 This report was verified electronically.

## 2018-05-05 NOTE — RADRPT
EXAM DATE/TIME:  05/05/2018 11:10 

 

[facility] COMPARISON:     

No previous studies available for comparison.

        

 

 

INDICATIONS :                

Swelling.

            

 

MEDICAL HISTORY :        

Cardiovascular disease. Hypertension.

 

SURGICAL HISTORY :     

Pacemaker. 

 

ENCOUNTER:     

Initial

 

ACUITY:     

1 day

 

PAIN SCORE:      

0/10

 

LOCATION:      

Bilateral  leg.

                       

 

TECHNIQUE:     

Venous ultrasound of the left and right leg was performed from the inguinal ligament to the proximal 
calf.  Real-time, color Doppler and spectral tracing, compression and augmentation techniques were us
ed.  

 

FINDINGS:     

 

RIGHT LEG:     

There is normal compressibility of the deep venous system from the inguinal region to the proximal ca
lf.  No echogenic clot is seen in the lumen of the common femoral, femoral, popliteal, and posterior 
tibial veins.  There is a normal response of the venous system to proximal and distal augmentation an
d respiration.  

 

LEFT LEG:     

There is normal compressibility of the deep venous system from the inguinal region to the proximal ca
lf.  No echogenic clot is seen in the lumen of the common femoral, femoral, popliteal, and posterior 
tibial veins.  There is a normal response of the venous system to proximal and distal augmentation an
d respiration.  

 

CONCLUSION:     

No DVT is identified within either lower extremity.

 

 

 

 David Milligan MD on May 05, 2018 at 11:57           

Board Certified Radiologist.

 This report was verified electronically.

## 2018-05-05 NOTE — HHI.PR
Subjective


Remarks


Follow-up GI bleed tolerate liquid diet with no abdominal pain, nausea or 

vomiting.  No bowel movement overnight.  Patient received 2 units PRBCs.  

Awaiting repeat H&H.  Otherwise stable, will continue IV fluids.  Await urine 

culture.  Gastroenterology in to see patient today, will administer 1 dose 

Xarelto today.  Possible colonoscopy on Monday morning.





Objective


Vitals





Vital Signs








  Date Time  Temp Pulse Resp B/P (MAP) Pulse Ox O2 Delivery O2 Flow Rate FiO2


 


5/5/18 08:00 98.5 64 17 173/78 (109) 98   


 


5/5/18 05:20 97.6 64 18 156/80    


 


5/5/18 04:32 96.0 77 16 160/108 97   


 


5/5/18 02:10 96.3 68 18 140/71 96   


 


5/5/18 00:00 96.2 72 20 145/67 (93) 99   


 


5/4/18 20:00 97.0 60 20 109/54 (72) 99   


 


5/4/18 19:53     97   21


 


5/4/18 18:28    129/83 (98)    


 


5/4/18 16:00 97.2 86 20 188/79 (115) 99   


 


5/4/18 15:21     99   21


 


5/4/18 15:00  85 17 168/88 (114)    


 


5/4/18 14:10  75 18 172/75 (107) 99 Room Air  


 


5/4/18 12:39  77 18 150/62 (91) 97 Room Air  


 


5/4/18 11:50     97 Room Air  


 


5/4/18 11:28 98.5 80 16 152/65 (94) 98   














I/O      


 


 5/4/18 5/4/18 5/4/18 5/5/18 5/5/18 5/5/18





 07:00 15:00 23:00 07:00 15:00 23:00


 


Intake Total   880 ml 940 ml 400 ml 


 


Output Total  300 ml  2100 ml  


 


Balance  -300 ml 880 ml -1160 ml 400 ml 


 


      


 


Intake Oral   600 ml 240 ml  


 


IV Total   280 ml   


 


Packed Cells    400 ml 400 ml 


 


Blood Product IV Normal Saline Flush    300 ml  


 


Output Urine Total  300 ml  2100 ml  


 


# Voids   1 1  








Result Diagram:  


5/5/18 1015                                                                    

            5/5/18 1015





Imaging





Last Impressions








Abdomen/Pelvis CT 5/4/18 0000 Signed





Impressions: 





 Service Date/Time:  Friday, May 4, 2018 23:32 - CONCLUSION:  No definite acute 





 CT findings in the abdomen or pelvis.     David Boles MD 








Objective Remarks


GENERAL: Well-developed, well-nourished elderly female patient in NAD.


SKIN: Warm and dry. No rash.


HEAD:  Normocephalic. Atraumatic.


EYES: Pupils equal and round. No scleral icterus. No injection or drainage. 


ENT: No nasal bleeding or discharge.  Mucous membranes pink and moist.


NECK: Supple. Trachea midline.  


CARDIOVASCULAR: Regular rate and rhythm.  S1, S2 noted. No murmur appreciated. 


RESPIRATORY: No accessory muscle use. Clear to auscultation. Breath sounds 

equal bilaterally.  


GASTROINTESTINAL: Abdomen soft, non-tender, nondistended. Normoactive bowel 

sounds x4.


MUSCULOSKELETAL: No obvious deformities. Extremities without clubbing, 

cyanosis.  Bilateral lower extremity edema, 2+.


NEUROLOGICAL: Awake and alert. No obvious cranial nerve deficits.  Motor 

grossly within normal limits. 5/5 muscle strength in bilateral upper and lower 

extremities.  Normal speech.


PSYCHIATRIC: Appropriate mood and affect; insight and judgment normal.





A/P


Problem List:  


(1) GI bleed


ICD Code:  K92.2 - Gastrointestinal hemorrhage, unspecified


Status:  Acute


Assessment and Plan


This is a pleasant 88-year-old female patient with a known medical history of 

A. fib on anticoagulation, hypertension hyperlipidemia who presented to the ED 

with a hemoglobin of 6.4.  Patient visited her PCP office this week and was 

called with results of anemia and sent here to the ED. 





Acute blood loss secondary to GI bleed with normocytic normochromic anemia.


- Hemoglobin 6.4/hematocrit 21.7 on presentation.  With black stools. Was given 

K Centra in ED.


- Consult placed to GI, appreciate input recommendations.  Gastroenterology in 

to see patient this morning plan for colonoscopy on Monday morning.  Patient 

agreeable.  We will give one-time dose of Xarelto today with clearance from GI.

  Hold tomorrow.


- Type and screen, transfuse 2 units PRBC.  Awaiting H&H this morning.  Monitor 

for any active bleeding.  Monitor vital signs.  Blood pressure has been stable.


- Monitor for fluid overload. Lasix given in between PRBC transfusion.


- Protonix IV BID. 


- Supportive care.





Abnormal UA with presence of leukocyte esterase and white blood cells.  Culture 

pending. Follow. IV Cipro, patient allergic to penicillins.  Will add Pyridium 

for dysuria complaints.





Atrial fibrillation, chronic: On Xarelto at home.  Will give one-time dose today

, hold tomorrow for preparation and colonoscopy on Monday morning.  Continue 

cardiac telemetry. Monitor.  Has been stable.





Hypertension, chronic: Elevated on presentation. Continue home medications. 

Monitor BP trends. Clonidine PRN as needed.





Lower extremity edema, no history of CHF.  Chest x-ray clear.  Lasix given 1.  

Gentle hydration for UTI.  Monitor overload.  Encourage elevation.





DVT Prophylaxis: SCDs. Hold chemical prophylaxis due to GI bleed.





Problem Qualifiers





(1) GI bleed:  


Qualified Codes:  K92.2 - Gastrointestinal hemorrhage, unspecified








Britt Mejia May 5, 2018 11:19

## 2018-05-06 VITALS
HEART RATE: 62 BPM | OXYGEN SATURATION: 95 % | TEMPERATURE: 96.7 F | DIASTOLIC BLOOD PRESSURE: 69 MMHG | RESPIRATION RATE: 20 BRPM | SYSTOLIC BLOOD PRESSURE: 152 MMHG

## 2018-05-06 VITALS
SYSTOLIC BLOOD PRESSURE: 206 MMHG | DIASTOLIC BLOOD PRESSURE: 85 MMHG | TEMPERATURE: 95.9 F | RESPIRATION RATE: 18 BRPM | OXYGEN SATURATION: 99 % | HEART RATE: 64 BPM

## 2018-05-06 VITALS
SYSTOLIC BLOOD PRESSURE: 158 MMHG | RESPIRATION RATE: 16 BRPM | OXYGEN SATURATION: 97 % | DIASTOLIC BLOOD PRESSURE: 68 MMHG | HEART RATE: 61 BPM | TEMPERATURE: 98.6 F

## 2018-05-06 VITALS
DIASTOLIC BLOOD PRESSURE: 72 MMHG | HEART RATE: 60 BPM | OXYGEN SATURATION: 96 % | RESPIRATION RATE: 16 BRPM | SYSTOLIC BLOOD PRESSURE: 163 MMHG | TEMPERATURE: 98.4 F

## 2018-05-06 VITALS
DIASTOLIC BLOOD PRESSURE: 67 MMHG | OXYGEN SATURATION: 97 % | SYSTOLIC BLOOD PRESSURE: 144 MMHG | HEART RATE: 63 BPM | RESPIRATION RATE: 20 BRPM | TEMPERATURE: 96.5 F

## 2018-05-06 VITALS
OXYGEN SATURATION: 95 % | HEART RATE: 62 BPM | DIASTOLIC BLOOD PRESSURE: 70 MMHG | TEMPERATURE: 98 F | SYSTOLIC BLOOD PRESSURE: 160 MMHG | RESPIRATION RATE: 17 BRPM

## 2018-05-06 VITALS — OXYGEN SATURATION: 96 %

## 2018-05-06 VITALS — HEART RATE: 59 BPM

## 2018-05-06 VITALS — OXYGEN SATURATION: 95 %

## 2018-05-06 LAB
INR PPP: 1 RATIO
INR PPP: 1 RATIO
INR PPP: 1.1 RATIO
PROTHROMBIN TIME: 10.5 SEC (ref 9.8–11.6)
PROTHROMBIN TIME: 10.6 SEC (ref 9.8–11.6)
PROTHROMBIN TIME: 11.1 SEC (ref 9.8–11.6)

## 2018-05-06 RX ADMIN — Medication SCH ML: at 20:56

## 2018-05-06 RX ADMIN — PHENAZOPYRIDINE SCH MG: 100 TABLET ORAL at 04:36

## 2018-05-06 RX ADMIN — CIPROFLOXACIN SCH MLS/HR: 2 INJECTION, SOLUTION INTRAVENOUS at 16:48

## 2018-05-06 RX ADMIN — PHENYTOIN SODIUM SCH MLS/HR: 50 INJECTION INTRAMUSCULAR; INTRAVENOUS at 10:04

## 2018-05-06 RX ADMIN — PANTOPRAZOLE SODIUM SCH MG: 40 INJECTION, POWDER, FOR SOLUTION INTRAVENOUS at 04:36

## 2018-05-06 RX ADMIN — STANDARDIZED SENNA CONCENTRATE AND DOCUSATE SODIUM SCH TAB: 8.6; 5 TABLET, FILM COATED ORAL at 08:32

## 2018-05-06 RX ADMIN — PHENAZOPYRIDINE SCH MG: 100 TABLET ORAL at 14:02

## 2018-05-06 RX ADMIN — LOSARTAN POTASSIUM SCH MG: 50 TABLET, FILM COATED ORAL at 20:56

## 2018-05-06 RX ADMIN — PHENAZOPYRIDINE SCH MG: 100 TABLET ORAL at 21:03

## 2018-05-06 RX ADMIN — Medication SCH ML: at 08:34

## 2018-05-06 RX ADMIN — Medication PRN ML: at 15:32

## 2018-05-06 RX ADMIN — PANTOPRAZOLE SODIUM SCH MG: 40 INJECTION, POWDER, FOR SOLUTION INTRAVENOUS at 15:32

## 2018-05-06 RX ADMIN — STANDARDIZED SENNA CONCENTRATE AND DOCUSATE SODIUM SCH TAB: 8.6; 5 TABLET, FILM COATED ORAL at 20:56

## 2018-05-06 RX ADMIN — Medication PRN ML: at 04:36

## 2018-05-06 NOTE — HHI.PR
Subjective


Remarks


Follow-up GI bleed.  Patient seen and examined, sitting up in chair comfortably 

no apparent distress.  Status post 2 unit PRBC, hemoglobin stable.  Denies any 

acute events.  No pain.  Tolerating p.o. intake, no abdominal pain, nausea or 

vomiting.  Plan for colonoscopy tomorrow.





Objective


Vitals





Vital Signs








  Date Time  Temp Pulse Resp B/P (MAP) Pulse Ox O2 Delivery O2 Flow Rate FiO2


 


5/6/18 08:06     95   21


 


5/6/18 04:00 96.7 62 20 152/69 (96) 95   


 


5/6/18 00:00 96.5 63 20 144/67 (92) 97   


 


5/5/18 20:00     97   21


 


5/5/18 20:00 96.1 70 20 167/72 (103) 98   


 


5/5/18 19:20  71      


 


5/5/18 16:00 98.1 69 18 145/65 (91) 97   


 


5/5/18 12:00 97.2 65 17 178/77 (110) 97   


 


5/5/18 11:43     97   














I/O      


 


 5/5/18 5/5/18 5/5/18 5/6/18 5/6/18 5/6/18





 07:00 15:00 23:00 07:00 15:00 23:00


 


Intake Total 940 ml 400 ml 960 ml 120 ml  


 


Output Total 2100 ml   1250 ml  


 


Balance -1160 ml 400 ml 960 ml -1130 ml  


 


      


 


Intake Oral 240 ml  960 ml 120 ml  


 


Packed Cells 400 ml 400 ml    


 


Blood Product IV Normal Saline Flush 300 ml     


 


Output Urine Total 2100 ml   1250 ml  


 


# Voids 1  6   


 


# Bowel Movements   0 1  








Result Diagram:  


5/5/18 1015                                                                    

            5/5/18 1015





Imaging





Last Impressions








Lower Extremity Ultrasound 5/5/18 0000 Signed





Impressions: 





 Service Date/Time:  Saturday, May 5, 2018 11:10 - CONCLUSION:  No DVT is 





 identified within either lower extremity.     David Milligan MD 


 


Abdomen/Pelvis CT 5/4/18 0000 Signed





Impressions: 





 Service Date/Time:  Friday, May 4, 2018 23:32 - CONCLUSION:  No definite acute 





 CT findings in the abdomen or pelvis.     David Boles MD 








Objective Remarks


GENERAL: Well-developed, well-nourished elderly female patient in NAD.


SKIN: Warm and dry. No rash.


HEAD:  Normocephalic. Atraumatic.


EYES: Pupils equal and round. No scleral icterus. No injection or drainage. 


ENT: No nasal bleeding or discharge.  Mucous membranes pink and moist.


NECK: Supple. Trachea midline.  


CARDIOVASCULAR: Regular rate and rhythm.  S1, S2 noted. No murmur appreciated. 


RESPIRATORY: No accessory muscle use. Clear to auscultation. Breath sounds 

equal bilaterally.  


GASTROINTESTINAL: Abdomen soft, non-tender, nondistended. Normoactive bowel 

sounds x4.


MUSCULOSKELETAL: No obvious deformities. Extremities without clubbing, 

cyanosis.  Bilateral lower extremity edema, 2+.


NEUROLOGICAL: Awake and alert. No obvious cranial nerve deficits.  Motor 

grossly within normal limits. 5/5 muscle strength in bilateral upper and lower 

extremities.  Normal speech.


PSYCHIATRIC: Appropriate mood and affect; insight and judgment normal.





A/P


Problem List:  


(1) GI bleed


ICD Code:  K92.2 - Gastrointestinal hemorrhage, unspecified


Status:  Acute


Assessment and Plan


This is a pleasant 88-year-old female patient with a known medical history of 

A. fib on anticoagulation, hypertension hyperlipidemia who presented to the ED 

with a hemoglobin of 6.4.  Patient visited her PCP office this week and was 

called with results of anemia and sent here to the ED. 





Acute blood loss secondary to GI bleed with normocytic normochromic anemia.


- Hemoglobin 6.4/hematocrit 21.7 on presentation.  With black stools. Was given 

K Centra in ED.


- Consult placed to GI, appreciate input recommendations.  Gastroenterology in 

to see patient this morning plan for colonoscopy on Monday morning.  Patient 

agreeable.  We will give one-time dose of Xarelto today with clearance from GI.

  Hold tomorrow.


- Type and screen, transfuse 2 units PRBC.  H&H stable.  Follow H&H trends..  

Monitor for any active bleeding.  Monitor vital signs.  Blood pressure has been 

stable.


- Monitor for fluid overload. Protonix IV BID. Supportive care.





Urinary tract infection, growing E. coli. IV Cipro, patient allergic to 

penicillins.  Will add Pyridium for dysuria complaints.





Atrial fibrillation, chronic: On Xarelto at home.  Will give one-time dose today

, hold tomorrow for preparation and colonoscopy on Monday morning.  Continue 

cardiac telemetry. Monitor.  Has been stable.





Hypertension, chronic: Elevated on presentation. Continue home medications. 

Monitor BP trends. Clonidine PRN as needed.





Lower extremity edema, no history of CHF. improved.  Chest x-ray clear.  Lasix 

given 1.  Gentle hydration for UTI.  Monitor overload.  Encourage elevation.





DVT Prophylaxis: SCDs. Hold chemical prophylaxis due to GI bleed.





Problem Qualifiers





(1) GI bleed:  


Qualified Codes:  K92.2 - Gastrointestinal hemorrhage, unspecified








Britt Mejia May 6, 2018 09:47

## 2018-05-07 VITALS
HEART RATE: 62 BPM | TEMPERATURE: 97.9 F | DIASTOLIC BLOOD PRESSURE: 88 MMHG | SYSTOLIC BLOOD PRESSURE: 193 MMHG | OXYGEN SATURATION: 98 % | RESPIRATION RATE: 18 BRPM

## 2018-05-07 VITALS
OXYGEN SATURATION: 97 % | DIASTOLIC BLOOD PRESSURE: 77 MMHG | RESPIRATION RATE: 18 BRPM | TEMPERATURE: 97.9 F | SYSTOLIC BLOOD PRESSURE: 152 MMHG | HEART RATE: 69 BPM

## 2018-05-07 VITALS
DIASTOLIC BLOOD PRESSURE: 61 MMHG | TEMPERATURE: 97 F | OXYGEN SATURATION: 98 % | RESPIRATION RATE: 18 BRPM | HEART RATE: 61 BPM | SYSTOLIC BLOOD PRESSURE: 141 MMHG

## 2018-05-07 VITALS
TEMPERATURE: 97 F | HEART RATE: 76 BPM | SYSTOLIC BLOOD PRESSURE: 219 MMHG | DIASTOLIC BLOOD PRESSURE: 88 MMHG | RESPIRATION RATE: 18 BRPM | OXYGEN SATURATION: 92 %

## 2018-05-07 VITALS
RESPIRATION RATE: 18 BRPM | OXYGEN SATURATION: 96 % | TEMPERATURE: 95.8 F | DIASTOLIC BLOOD PRESSURE: 82 MMHG | HEART RATE: 61 BPM | SYSTOLIC BLOOD PRESSURE: 206 MMHG

## 2018-05-07 VITALS — SYSTOLIC BLOOD PRESSURE: 115 MMHG | RESPIRATION RATE: 17 BRPM | DIASTOLIC BLOOD PRESSURE: 50 MMHG | HEART RATE: 68 BPM

## 2018-05-07 VITALS
HEART RATE: 59 BPM | DIASTOLIC BLOOD PRESSURE: 82 MMHG | TEMPERATURE: 96 F | RESPIRATION RATE: 18 BRPM | OXYGEN SATURATION: 99 % | SYSTOLIC BLOOD PRESSURE: 164 MMHG

## 2018-05-07 VITALS — DIASTOLIC BLOOD PRESSURE: 71 MMHG | HEART RATE: 60 BPM | SYSTOLIC BLOOD PRESSURE: 188 MMHG | RESPIRATION RATE: 18 BRPM

## 2018-05-07 LAB
INR PPP: 1 RATIO
INR PPP: 1.1 RATIO
PROTHROMBIN TIME: 10.6 SEC (ref 9.8–11.6)
PROTHROMBIN TIME: 10.7 SEC (ref 9.8–11.6)

## 2018-05-07 PROCEDURE — 3E0H8TZ INTRODUCTION OF DESTRUCTIVE AGENT INTO LOWER GI, VIA NATURAL OR ARTIFICIAL OPENING ENDOSCOPIC: ICD-10-PCS | Performed by: INTERNAL MEDICINE

## 2018-05-07 PROCEDURE — 0DBP8ZZ EXCISION OF RECTUM, VIA NATURAL OR ARTIFICIAL OPENING ENDOSCOPIC: ICD-10-PCS | Performed by: INTERNAL MEDICINE

## 2018-05-07 PROCEDURE — 0W3P8ZZ CONTROL BLEEDING IN GASTROINTESTINAL TRACT, VIA NATURAL OR ARTIFICIAL OPENING ENDOSCOPIC: ICD-10-PCS | Performed by: INTERNAL MEDICINE

## 2018-05-07 PROCEDURE — 3E0G8GC INTRODUCTION OF OTHER THERAPEUTIC SUBSTANCE INTO UPPER GI, VIA NATURAL OR ARTIFICIAL OPENING ENDOSCOPIC: ICD-10-PCS | Performed by: INTERNAL MEDICINE

## 2018-05-07 RX ADMIN — STANDARDIZED SENNA CONCENTRATE AND DOCUSATE SODIUM SCH TAB: 8.6; 5 TABLET, FILM COATED ORAL at 09:00

## 2018-05-07 RX ADMIN — Medication PRN ML: at 05:33

## 2018-05-07 RX ADMIN — PHENAZOPYRIDINE SCH MG: 100 TABLET ORAL at 05:33

## 2018-05-07 RX ADMIN — PHENAZOPYRIDINE SCH MG: 100 TABLET ORAL at 16:08

## 2018-05-07 RX ADMIN — PHENYTOIN SODIUM SCH MLS/HR: 50 INJECTION INTRAMUSCULAR; INTRAVENOUS at 09:11

## 2018-05-07 RX ADMIN — Medication SCH ML: at 09:00

## 2018-05-07 RX ADMIN — PANTOPRAZOLE SODIUM SCH MG: 40 INJECTION, POWDER, FOR SOLUTION INTRAVENOUS at 05:33

## 2018-05-07 RX ADMIN — Medication PRN ML: at 05:34

## 2018-05-07 NOTE — HHI.DS
__________________________________________________





Discharge Summary


Admission Date


May 4, 2018 at 13:15


Discharge Date:  May 7, 2018


Admitting Diagnosis


GI bleed on xarelto





(1) GI bleed


ICD Code:  K92.2 - Gastrointestinal hemorrhage, unspecified


Status:  Acute


Procedures


EGD and colonoscopy.


Brief History - From Admission


This is a pleasant 88-year-old female patient with a known medical history of 

A. fib on anticoagulation, hypertension hyperlipidemia who presented to the ED 

with a hemoglobin of 6.4.  Patient visited her PCP office this week and was 

called with results of anemia and sent here to the ED.  Patient does state that 

over the past couple months she has noticed black stools with occasional bright 

red blood when she wipes.  Patient denies any other associated symptoms such as 

lightheadedness, dizziness.  She does admit to increasing fatigue and sleeping 

more often.  Patient has been on Xarelto for chronic atrial fibrillation, 

patient is followed with Dr. Parish cardiology. Denies any recent fever, 

chills, cough, chest pain, shortness of breath, nausea, vomiting, diarrhea, 

abdominal pain or dysuria.  Does admit to bilateral lower extremity swelling.  

Patient lives at home with her daughter.  PCP Dr. Kahn.


CBC/BMP:  


5/5/18 1015                                                                    

            5/5/18 1015





Significant Findings





Laboratory Tests








Test


  5/4/18


19:28 5/5/18


01:45 5/5/18


10:15 5/5/18


16:15


 


Hemoglobin


  


  


  10.0 GM/DL


(11.6-15.3) 


 


 


Hematocrit


  


  


  32.4 %


(35.0-46.0) 


 


 


Mean Corpuscular Volume


  


  


  74.2 FL


(80.0-100.0) 


 


 


Mean Corpuscular Hemoglobin


  


  


  22.9 PG


(27.0-34.0) 


 


 


Mean Corpuscular Hemoglobin


Concent 


  


  30.9 %


(32.0-36.0) 


 


 


Red Cell Distribution Width


  


  


  20.5 %


(11.6-17.2) 


 


 


Neutrophils (%) (Auto)


  


  


  72.1 %


(16.0-70.0) 


 


 


Monocytes (%) (Auto)


  


  


  9.6 %


(0.0-8.0) 


 


 


Lymphocytes # (Auto)


  


  


  0.8 TH/MM3


(1.0-4.8) 


 


 


Ovalocytes   1+ (NORMAL)  


 


Random Glucose


  


  


  110 MG/DL


() 


 


 


Chloride Level


  


  


  97 MEQ/L


() 


 


 


Carbon Dioxide Level


  


  


  32.4 MEQ/L


(21.0-32.0) 


 


 


Prothrombin Time


  


  


  


  12.3 SEC


(9.8-11.6)


 


Test


  5/5/18


21:24 5/6/18


07:58 5/6/18


14:20 5/6/18


19:30


 


Test


  5/7/18


02:00 5/7/18


08:10 


  


 








Imaging





Last Impressions








Lower Extremity Ultrasound 5/5/18 0000 Signed





Impressions: 





 Service Date/Time:  Saturday, May 5, 2018 11:10 - CONCLUSION:  No DVT is 





 identified within either lower extremity.     David Milligan MD 


 


Abdomen/Pelvis CT 5/4/18 0000 Signed





Impressions: 





 Service Date/Time:  Friday, May 4, 2018 23:32 - CONCLUSION:  No definite acute 





 CT findings in the abdomen or pelvis.     David Boles MD 








PE at Discharge


GENERAL: Well-developed, well-nourished elderly female patient in NAD.


SKIN: Warm and dry. No rash.


HEAD:  Normocephalic. Atraumatic.


EYES: Pupils equal and round. No scleral icterus. No injection or drainage. 


ENT: No nasal bleeding or discharge.  Mucous membranes pink and moist.


NECK: Supple. Trachea midline.  


CARDIOVASCULAR: Regular rate and rhythm.  S1, S2 noted. No murmur appreciated. 


RESPIRATORY: No accessory muscle use. Clear to auscultation. Breath sounds 

equal bilaterally.  


GASTROINTESTINAL: Abdomen soft, non-tender, nondistended. Normoactive bowel 

sounds x4.


MUSCULOSKELETAL: No obvious deformities. Extremities without clubbing, 

cyanosis. 


NEUROLOGICAL: Awake and alert. No obvious cranial nerve deficits.  Motor 

grossly within normal limits. 5/5 muscle strength in bilateral upper and lower 

extremities.  Normal speech.


PSYCHIATRIC: Appropriate mood and affect; insight and judgment normal.


Pt update on day of discharge


Follow-up GI bleed.  Patient seen and examined, walking around hallways with 

PT.  Doing well.  No weakness.  No further GI bleeding or dark stools.  Plan 

for colonoscopy this afternoon.  Depending on results patient will be 

discharged today.  Labs are stable.  Vital signs are stable.  Afebrile.  No 

acute complaints.


Hospital Course


This is a pleasant 88-year-old female patient with a known medical history of 

A. fib on anticoagulation, hypertension hyperlipidemia who presented to the ED 

with a hemoglobin of 6.4.  Patient visited her PCP office this week and was 

called with results of anemia and sent here to the ED. Acute blood loss 

secondary to GI bleed with normocytic normochromic anemia. Hemoglobin 6.4/

hematocrit 21.7 on presentation.  With black stools. Was given K Centra in ED. 

Status post 2 units PRBS transfusion. H&H stable. Consult placed to GI, 

appreciate input recommendations. Colonoscopy and EGD today, results reviewed 

with patient and son. Will restart on Xarelto. F/u GI and cardiology outpatient 

for further recommendations regarding anticoagulant recommendations. Protonix 

40 mg daily. UTI found with E coli. Was placed IV Cipro, was given Pyridium for 

dysuria complaints. Atrial fibrillation, chronic: On Xarelto at home. Will 

restart. Follow with Dr. Parish outpatient. Hypertension, chronic: Elevated 

on presentation. Continue home Losartan. Started on Amlodipine. BP trends WNL. 

Follow with outpatient PCP and cards.


Pt Condition on Discharge:  Stable


Discharge Disposition:  Discharge Home


Discharge Time:  > 30 minutes


Discharge Instructions


DIET: Follow Instructions for:  Heart Healthy Diet


Speech Therapy-Diet Recommends:  Regular


Activities you can perform:  Regular-No Restrictions


Follow up Referrals:  


Cardiology - 2 Weeks with Preston Parish MD


PCP Follow-up - 1 Week





New Medications:  


Amlodipine (Norvasc) 5 Mg Tab


5 MG PO DAILY for HTN for 30 Days, #30 TAB





Ciprofloxacin (Cipro) 250 Mg Tab


250 MG PO Q12HR for UTI for 3 Days, #6 TAB





Pantoprazole (Pantoprazole) 40 Mg Tab


40 MG PO DAILY for GI for 14 Days, #14 TAB





 


Continued Medications:  


Losartan (Losartan) 100 Mg Tab


100 MG PO HS for Blood Pressure Management, #30 TAB 0 Refills





Rivaroxaban (Xarelto) 20 Mg Tab


20 MG PO DAILY for Blood Clot Prevention, TAB 0 Refills





Timolol Opth Gel (Timoptic-Xe Opth Gel) 0.5 % Gel


1 DROP EACH EYE DAILY for Glaucoma, #1 BOTTLE 0 Refills





[glaucoma drops] ()  


1 DROP OP BID

















Britt Mejia May 7, 2018 17:36

## 2018-05-07 NOTE — HHI.DCPOC
Discharge Care Plan


Diagnosis:  


(1) GI bleed


(2) Atrial fibrillation


(3) Gastritis


(4) UTI (urinary tract infection)


Goals to Promote Your Health


* To prevent worsening of your condition and complications


* To maintain your health at the optimal level


Directions to Meet Your Goals


*** Take your medications as prescribed


*** Follow your dietary instruction


*** Follow activity as directed








*** Keep your appointments as scheduled


*** Take your immunizations and boosters as scheduled


*** If your symptoms worsen call your PCP, if no PCP go to Urgent Care Center 

or Emergency Room***


*** Smoking is Dangerous to Your Health. Avoid second hand smoke***


***Call the 24-hour hour crisis hotline for domestic abuse at 1-512.624.6236***











Britt Mejia May 7, 2018 17:26

## 2018-05-07 NOTE — HHI.PR
Subjective


Remarks


Follow-up GI bleed.  Patient seen and examined, walking around hallways with 

PT.  Doing well.  No weakness.  No further GI bleeding or dark stools.  Plan 

for colonoscopy this afternoon.  Depending on results patient will be 

discharged today.  Labs are stable.  Vital signs are stable.  Afebrile.  No 

acute complaints.





Objective


Vitals





Vital Signs








  Date Time  Temp Pulse Resp B/P (MAP) Pulse Ox O2 Delivery O2 Flow Rate FiO2


 


5/7/18 08:00 97.9 62 18 193/88 (123) 98   


 


5/7/18 06:00 95.8 61 18 206/82 (123) 96   


 


5/7/18 02:00 96.0 59 18 164/82 (109) 99   


 


5/6/18 23:44  59      


 


5/6/18 22:36 95.9 64 18 206/85 (125) 99   


 


5/6/18 22:00     96   21


 


5/6/18 16:00 98.0 62 17 160/70 (100) 95   


 


5/6/18 12:00 98.6 61 16 158/68 (98) 97   














I/O      


 


 5/6/18 5/6/18 5/6/18 5/7/18 5/7/18 5/7/18





 07:00 15:00 23:00 07:00 15:00 23:00


 


Intake Total 120 ml     


 


Output Total 1250 ml     


 


Balance -1130 ml     


 


      


 


Intake Oral 120 ml     


 


Output Urine Total 1250 ml     


 


# Bowel Movements 1     








Result Diagram:  


5/5/18 1015                                                                    

            5/5/18 1015





Imaging





Last Impressions








Lower Extremity Ultrasound 5/5/18 0000 Signed





Impressions: 





 Service Date/Time:  Saturday, May 5, 2018 11:10 - CONCLUSION:  No DVT is 





 identified within either lower extremity.     David Milligan MD 


 


Abdomen/Pelvis CT 5/4/18 0000 Signed





Impressions: 





 Service Date/Time:  Friday, May 4, 2018 23:32 - CONCLUSION:  No definite acute 





 CT findings in the abdomen or pelvis.     David Boles MD 








Objective Remarks


GENERAL: Well-developed, well-nourished elderly female patient in NAD.


SKIN: Warm and dry. No rash.


HEAD:  Normocephalic. Atraumatic.


EYES: Pupils equal and round. No scleral icterus. No injection or drainage. 


ENT: No nasal bleeding or discharge.  Mucous membranes pink and moist.


NECK: Supple. Trachea midline.  


CARDIOVASCULAR: Regular rate and rhythm.  S1, S2 noted. No murmur appreciated. 


RESPIRATORY: No accessory muscle use. Clear to auscultation. Breath sounds 

equal bilaterally.  


GASTROINTESTINAL: Abdomen soft, non-tender, nondistended. Normoactive bowel 

sounds x4.


MUSCULOSKELETAL: No obvious deformities. Extremities without clubbing, 

cyanosis.  Bilateral lower extremity edema, 2+.


NEUROLOGICAL: Awake and alert. No obvious cranial nerve deficits.  Motor 

grossly within normal limits. 5/5 muscle strength in bilateral upper and lower 

extremities.  Normal speech.


PSYCHIATRIC: Appropriate mood and affect; insight and judgment normal.





A/P


Problem List:  


(1) GI bleed


ICD Code:  K92.2 - Gastrointestinal hemorrhage, unspecified


Status:  Acute


Assessment and Plan


This is a pleasant 88-year-old female patient with a known medical history of 

A. fib on anticoagulation, hypertension hyperlipidemia who presented to the ED 

with a hemoglobin of 6.4.  Patient visited her PCP office this week and was 

called with results of anemia and sent here to the ED. 





Acute blood loss secondary to GI bleed with normocytic normochromic anemia.


    - Hemoglobin 6.4/hematocrit 21.7 on presentation.  With black stools. Was 

given K Centra in ED.


   - Status post 2 units PRBS transfusion. H&H stable.  Follow H&H trends..  

Monitor for any active bleeding.  Monitor vital signs.  Blood pressure has been 

stable.


   - Consult placed to GI, appreciate input recommendations. Colonoscopy today.

  Patient agreeable. Xarelto on hold.


   - Monitor for fluid overload. Protonix IV BID. Supportive care.





Urinary tract infection, growing E. coli. IV Cipro, patient allergic to 

penicillins. Will change IV to PO.  Continue Pyridium for dysuria complaints.





Atrial fibrillation, chronic: On Xarelto at home.  On hold for colonoscopy 

today.  Continue cardiac telemetry. Monitor.  Has been stable.





Hypertension, chronic: Elevated on presentation. Continue home medications. 

Monitor BP trends. Still elevated. Patient refusing anything other than 

clonidine. Clonidine PRN as needed.





Lower extremity edema, no history of CHF. Improved.  Chest x-ray clear.  Lasix 

given 1.  Gentle hydration for UTI.  Monitor overload.  Encourage elevation.





DVT Prophylaxis: SCDs. Hold chemical prophylaxis due to GI bleed.





Problem Qualifiers





(1) GI bleed:  


Qualified Codes:  K92.2 - Gastrointestinal hemorrhage, unspecified








Jackie,Britt ARNP May 7, 2018 09:51